# Patient Record
Sex: FEMALE | Race: WHITE | NOT HISPANIC OR LATINO | Employment: OTHER | URBAN - METROPOLITAN AREA
[De-identification: names, ages, dates, MRNs, and addresses within clinical notes are randomized per-mention and may not be internally consistent; named-entity substitution may affect disease eponyms.]

---

## 2017-09-08 ENCOUNTER — APPOINTMENT (OUTPATIENT)
Dept: GENERAL RADIOLOGY | Facility: HOSPITAL | Age: 72
End: 2017-09-08

## 2017-09-08 ENCOUNTER — HOSPITAL ENCOUNTER (EMERGENCY)
Facility: HOSPITAL | Age: 72
Discharge: HOME | End: 2017-09-09
Attending: EMERGENCY MEDICINE | Admitting: EMERGENCY MEDICINE

## 2017-09-08 ENCOUNTER — APPOINTMENT (OUTPATIENT)
Dept: CT IMAGING | Facility: HOSPITAL | Age: 72
End: 2017-09-08

## 2017-09-08 DIAGNOSIS — R55 NEAR SYNCOPE: Primary | ICD-10-CM

## 2017-09-08 DIAGNOSIS — S01.81XA FOREHEAD LACERATION, INITIAL ENCOUNTER: ICD-10-CM

## 2017-09-08 DIAGNOSIS — Z86.79 HISTORY OF SINUS BRADYCARDIA: ICD-10-CM

## 2017-09-08 DIAGNOSIS — E86.9 VOLUME DEPLETION: ICD-10-CM

## 2017-09-08 LAB
ALBUMIN SERPL-MCNC: 3.8 G/DL (ref 3.2–4.8)
ALBUMIN/GLOB SERPL: 1.1 G/DL (ref 1.5–2.5)
ALP SERPL-CCNC: 86 U/L (ref 25–100)
ALT SERPL W P-5'-P-CCNC: 20 U/L (ref 7–40)
ANION GAP SERPL CALCULATED.3IONS-SCNC: 5 MMOL/L (ref 3–11)
AST SERPL-CCNC: 24 U/L (ref 0–33)
BASOPHILS # BLD AUTO: 0.03 10*3/MM3 (ref 0–0.2)
BASOPHILS NFR BLD AUTO: 0.4 % (ref 0–1)
BILIRUB SERPL-MCNC: 0.4 MG/DL (ref 0.3–1.2)
BILIRUB UR QL STRIP: NEGATIVE
BNP SERPL-MCNC: 50 PG/ML (ref 0–100)
BUN BLD-MCNC: 29 MG/DL (ref 9–23)
BUN/CREAT SERPL: 24.2 (ref 7–25)
CALCIUM SPEC-SCNC: 9.3 MG/DL (ref 8.7–10.4)
CHLORIDE SERPL-SCNC: 109 MMOL/L (ref 99–109)
CLARITY UR: CLEAR
CO2 SERPL-SCNC: 25 MMOL/L (ref 20–31)
COLOR UR: YELLOW
CREAT BLD-MCNC: 1.2 MG/DL (ref 0.6–1.3)
DEPRECATED RDW RBC AUTO: 44.3 FL (ref 37–54)
EOSINOPHIL # BLD AUTO: 0.15 10*3/MM3 (ref 0–0.3)
EOSINOPHIL NFR BLD AUTO: 1.9 % (ref 0–3)
ERYTHROCYTE [DISTWIDTH] IN BLOOD BY AUTOMATED COUNT: 12.9 % (ref 11.3–14.5)
GFR SERPL CREATININE-BSD FRML MDRD: 44 ML/MIN/1.73
GLOBULIN UR ELPH-MCNC: 3.4 GM/DL
GLUCOSE BLD-MCNC: 173 MG/DL (ref 70–100)
GLUCOSE UR STRIP-MCNC: NEGATIVE MG/DL
HCT VFR BLD AUTO: 39.9 % (ref 34.5–44)
HGB BLD-MCNC: 13.2 G/DL (ref 11.5–15.5)
HGB UR QL STRIP.AUTO: NEGATIVE
IMM GRANULOCYTES # BLD: 0.02 10*3/MM3 (ref 0–0.03)
IMM GRANULOCYTES NFR BLD: 0.3 % (ref 0–0.6)
KETONES UR QL STRIP: NEGATIVE
LEUKOCYTE ESTERASE UR QL STRIP.AUTO: NEGATIVE
LIPASE SERPL-CCNC: 32 U/L (ref 6–51)
LYMPHOCYTES # BLD AUTO: 1.68 10*3/MM3 (ref 0.6–4.8)
LYMPHOCYTES NFR BLD AUTO: 21.4 % (ref 24–44)
MCH RBC QN AUTO: 31.1 PG (ref 27–31)
MCHC RBC AUTO-ENTMCNC: 33.1 G/DL (ref 32–36)
MCV RBC AUTO: 93.9 FL (ref 80–99)
MONOCYTES # BLD AUTO: 0.63 10*3/MM3 (ref 0–1)
MONOCYTES NFR BLD AUTO: 8 % (ref 0–12)
NEUTROPHILS # BLD AUTO: 5.35 10*3/MM3 (ref 1.5–8.3)
NEUTROPHILS NFR BLD AUTO: 68 % (ref 41–71)
NITRITE UR QL STRIP: NEGATIVE
PH UR STRIP.AUTO: 6.5 [PH] (ref 5–8)
PLATELET # BLD AUTO: 186 10*3/MM3 (ref 150–450)
PMV BLD AUTO: 10 FL (ref 6–12)
POTASSIUM BLD-SCNC: 4.2 MMOL/L (ref 3.5–5.5)
PROT SERPL-MCNC: 7.2 G/DL (ref 5.7–8.2)
PROT UR QL STRIP: NEGATIVE
RBC # BLD AUTO: 4.25 10*6/MM3 (ref 3.89–5.14)
SODIUM BLD-SCNC: 139 MMOL/L (ref 132–146)
SP GR UR STRIP: 1.01 (ref 1–1.03)
TROPONIN I SERPL-MCNC: 0 NG/ML (ref 0–0.07)
TROPONIN I SERPL-MCNC: 0 NG/ML (ref 0–0.07)
UROBILINOGEN UR QL STRIP: NORMAL
WBC NRBC COR # BLD: 7.86 10*3/MM3 (ref 3.5–10.8)

## 2017-09-08 PROCEDURE — 99284 EMERGENCY DEPT VISIT MOD MDM: CPT

## 2017-09-08 PROCEDURE — 70450 CT HEAD/BRAIN W/O DYE: CPT

## 2017-09-08 PROCEDURE — 84484 ASSAY OF TROPONIN QUANT: CPT

## 2017-09-08 PROCEDURE — 83690 ASSAY OF LIPASE: CPT | Performed by: EMERGENCY MEDICINE

## 2017-09-08 PROCEDURE — 93005 ELECTROCARDIOGRAM TRACING: CPT | Performed by: EMERGENCY MEDICINE

## 2017-09-08 PROCEDURE — 80053 COMPREHEN METABOLIC PANEL: CPT | Performed by: EMERGENCY MEDICINE

## 2017-09-08 PROCEDURE — 25010000002 TDAP 5-2.5-18.5 LF-MCG/0.5 SUSPENSION: Performed by: EMERGENCY MEDICINE

## 2017-09-08 PROCEDURE — 85025 COMPLETE CBC W/AUTO DIFF WBC: CPT | Performed by: EMERGENCY MEDICINE

## 2017-09-08 PROCEDURE — 90471 IMMUNIZATION ADMIN: CPT | Performed by: EMERGENCY MEDICINE

## 2017-09-08 PROCEDURE — 71010 HC CHEST PA OR AP: CPT

## 2017-09-08 PROCEDURE — 93005 ELECTROCARDIOGRAM TRACING: CPT

## 2017-09-08 PROCEDURE — 83880 ASSAY OF NATRIURETIC PEPTIDE: CPT | Performed by: EMERGENCY MEDICINE

## 2017-09-08 PROCEDURE — 96360 HYDRATION IV INFUSION INIT: CPT

## 2017-09-08 PROCEDURE — 90715 TDAP VACCINE 7 YRS/> IM: CPT | Performed by: EMERGENCY MEDICINE

## 2017-09-08 PROCEDURE — 81003 URINALYSIS AUTO W/O SCOPE: CPT | Performed by: EMERGENCY MEDICINE

## 2017-09-08 RX ORDER — LOSARTAN POTASSIUM 50 MG/1
50 TABLET ORAL DAILY
COMMUNITY

## 2017-09-08 RX ORDER — LANOLIN ALCOHOL/MO/W.PET/CERES
1000 CREAM (GRAM) TOPICAL DAILY
COMMUNITY

## 2017-09-08 RX ORDER — LITHIUM CARBONATE 300 MG/1
300 CAPSULE ORAL
COMMUNITY

## 2017-09-08 RX ORDER — SODIUM CHLORIDE 0.9 % (FLUSH) 0.9 %
10 SYRINGE (ML) INJECTION AS NEEDED
Status: DISCONTINUED | OUTPATIENT
Start: 2017-09-08 | End: 2017-09-09 | Stop reason: HOSPADM

## 2017-09-08 RX ORDER — LEVOTHYROXINE SODIUM 0.12 MG/1
125 TABLET ORAL DAILY
COMMUNITY

## 2017-09-08 RX ORDER — MELATONIN
1000 DAILY
COMMUNITY

## 2017-09-08 RX ORDER — MULTIVITAMIN WITH IRON
TABLET ORAL
COMMUNITY

## 2017-09-08 RX ORDER — LIDOCAINE HYDROCHLORIDE AND EPINEPHRINE 10; 10 MG/ML; UG/ML
10 INJECTION, SOLUTION INFILTRATION; PERINEURAL ONCE
Status: COMPLETED | OUTPATIENT
Start: 2017-09-08 | End: 2017-09-08

## 2017-09-08 RX ADMIN — LIDOCAINE HYDROCHLORIDE,EPINEPHRINE BITARTRATE 10 ML: 10; .01 INJECTION, SOLUTION INFILTRATION; PERINEURAL at 22:20

## 2017-09-08 RX ADMIN — TETANUS TOXOID, REDUCED DIPHTHERIA TOXOID AND ACELLULAR PERTUSSIS VACCINE, ADSORBED 0.5 ML: 5; 2.5; 8; 8; 2.5 SUSPENSION INTRAMUSCULAR at 21:49

## 2017-09-08 RX ADMIN — SODIUM CHLORIDE 1000 ML: 9 INJECTION, SOLUTION INTRAVENOUS at 22:46

## 2017-09-09 VITALS
HEIGHT: 65 IN | SYSTOLIC BLOOD PRESSURE: 144 MMHG | DIASTOLIC BLOOD PRESSURE: 77 MMHG | WEIGHT: 188 LBS | OXYGEN SATURATION: 100 % | TEMPERATURE: 97.8 F | RESPIRATION RATE: 16 BRPM | HEART RATE: 72 BPM | BODY MASS INDEX: 31.32 KG/M2

## 2017-09-09 NOTE — ED PROVIDER NOTES
Subjective   HPI Comments: The patient presents to the emergency department after near syncopal episode at a ball game tonight.  The patient is visiting from out of town and states she was sitting in the stands and started to feel faint and dizzy.  She states she felt like she needed to go to the bathroom.  She then ambulated to the restroom it which point she urinated and on the point she came out of the restroom she nearly passed out.  She lowered herself to the ground.  She states she never fully lost consciousness.  The patient reports a history of similar with an episode back in 2014 but often feels dizzy and near syncopal.  She states that she sees a cardiologist for this as had a full workup in [place].  The patient reports she had a complete cardiac workup in June.  She does report a history of sinus bradycardia.  The patient denies any chest pain or shortness of breath.  She states is similar to prior episodes.  She does have a laceration above the left eyebrow from her glasses.      History provided by:  Patient and relative      Review of Systems   Constitutional: Negative.    Respiratory: Negative.    Cardiovascular: Negative.    Gastrointestinal: Negative.    Skin: Positive for wound.   Neurological: Positive for dizziness, syncope and light-headedness.   Psychiatric/Behavioral: Negative.    All other systems reviewed and are negative.      Past Medical History:   Diagnosis Date   • Bipolar 1 disorder    • Depression    • Disease of thyroid gland    • Hypertension    • Renal disorder        No Known Allergies    Past Surgical History:   Procedure Laterality Date   • ADENOIDECTOMY     • DILATION AND CURETTAGE, DIAGNOSTIC / THERAPEUTIC     • KNEE MENISCAL REPAIR     • TONSILLECTOMY         History reviewed. No pertinent family history.    Social History     Social History   • Marital status:      Spouse name: N/A   • Number of children: N/A   • Years of education: N/A     Social History Main Topics    • Smoking status: Former Smoker   • Smokeless tobacco: None   • Alcohol use Yes      Comment: occasional - wine   • Drug use: No   • Sexual activity: Not Asked     Other Topics Concern   • None     Social History Narrative   • None           Objective   Physical Exam   Constitutional: She is oriented to person, place, and time. She appears well-developed and well-nourished. No distress.   HENT:   Head: Normocephalic. Head is with laceration. Head is without raccoon's eyes and without Strickland's sign.       Eyes: EOM are normal. Pupils are equal, round, and reactive to light.   Neck: Normal range of motion. Neck supple.   Cardiovascular: Normal rate, regular rhythm, normal heart sounds and intact distal pulses.    Occasional ectopy.   Pulmonary/Chest: Effort normal and breath sounds normal.   Abdominal: Soft. There is no tenderness. There is no rebound and no guarding.   Musculoskeletal: Normal range of motion.   No midline C, T, L-spine tenderness to palpation or step-off.   Neurological: She is alert and oriented to person, place, and time.   Skin: Skin is warm and dry. Laceration (3CM laceration on the left side superior and lateral to the left eyebrow.) noted.   Psychiatric: She has a normal mood and affect. Her behavior is normal.   Nursing note and vitals reviewed.      Laceration Repair  Date/Time: 9/8/2017 10:22 PM  Performed by: ROMELIA MARCOS  Authorized by: ROMELIA MARCOS   Consent: Verbal consent obtained.  Risks and benefits: risks, benefits and alternatives were discussed  Consent given by: patient  Patient understanding: patient states understanding of the procedure being performed  Patient consent: the patient's understanding of the procedure matches consent given  Patient identity confirmed: verbally with patient  Body area: head/neck  Location details: left eyebrow  Foreign bodies: no foreign bodies  Tendon involvement: none  Nerve involvement: none  Vascular damage:  no    Anesthesia:  Local Anesthetic: lidocaine 1% with epinephrine    Sedation:  Patient sedated: no  Preparation: Patient was prepped and draped in the usual sterile fashion.  Irrigation solution: saline  Amount of cleaning: standard  Debridement: none  Degree of undermining: none  Skin closure: 5-0 Prolene  Number of sutures: 5  Technique: simple  Approximation: close  Approximation difficulty: simple  Patient tolerance: Patient tolerated the procedure well with no immediate complications               ED Course  ED Course   Value Comment By Time   Troponin I: 0.00 (Reviewed) John Cabral MD 09/09 0000    Patient has 2 sets of cardiac enzymes.  Patient's laceration has been repaired.  Patient has had this evaluated in the past with history of similar.  Patient states she does not need to be admitted and states she will follow-up with her doctor.  We will discharge her home with her daughter for close observation and follow-up as soon as possible.  They're to return here the ER for any concerns.  They voice understanding and agree. John Cabral MD 09/09 0001      Recent Results (from the past 24 hour(s))   Comprehensive Metabolic Panel    Collection Time: 09/08/17  9:16 PM   Result Value Ref Range    Glucose 173 (H) 70 - 100 mg/dL    BUN 29 (H) 9 - 23 mg/dL    Creatinine 1.20 0.60 - 1.30 mg/dL    Sodium 139 132 - 146 mmol/L    Potassium 4.2 3.5 - 5.5 mmol/L    Chloride 109 99 - 109 mmol/L    CO2 25.0 20.0 - 31.0 mmol/L    Calcium 9.3 8.7 - 10.4 mg/dL    Total Protein 7.2 5.7 - 8.2 g/dL    Albumin 3.80 3.20 - 4.80 g/dL    ALT (SGPT) 20 7 - 40 U/L    AST (SGOT) 24 0 - 33 U/L    Alkaline Phosphatase 86 25 - 100 U/L    Total Bilirubin 0.4 0.3 - 1.2 mg/dL    eGFR Non African Amer 44 (L) >60 mL/min/1.73    Globulin 3.4 gm/dL    A/G Ratio 1.1 (L) 1.5 - 2.5 g/dL    BUN/Creatinine Ratio 24.2 7.0 - 25.0    Anion Gap 5.0 3.0 - 11.0 mmol/L   Lipase    Collection Time: 09/08/17  9:16 PM   Result Value Ref Range     Lipase 32 6 - 51 U/L   BNP    Collection Time: 09/08/17  9:16 PM   Result Value Ref Range    BNP 50.0 0.0 - 100.0 pg/mL   CBC Auto Differential    Collection Time: 09/08/17  9:16 PM   Result Value Ref Range    WBC 7.86 3.50 - 10.80 10*3/mm3    RBC 4.25 3.89 - 5.14 10*6/mm3    Hemoglobin 13.2 11.5 - 15.5 g/dL    Hematocrit 39.9 34.5 - 44.0 %    MCV 93.9 80.0 - 99.0 fL    MCH 31.1 (H) 27.0 - 31.0 pg    MCHC 33.1 32.0 - 36.0 g/dL    RDW 12.9 11.3 - 14.5 %    RDW-SD 44.3 37.0 - 54.0 fl    MPV 10.0 6.0 - 12.0 fL    Platelets 186 150 - 450 10*3/mm3    Neutrophil % 68.0 41.0 - 71.0 %    Lymphocyte % 21.4 (L) 24.0 - 44.0 %    Monocyte % 8.0 0.0 - 12.0 %    Eosinophil % 1.9 0.0 - 3.0 %    Basophil % 0.4 0.0 - 1.0 %    Immature Grans % 0.3 0.0 - 0.6 %    Neutrophils, Absolute 5.35 1.50 - 8.30 10*3/mm3    Lymphocytes, Absolute 1.68 0.60 - 4.80 10*3/mm3    Monocytes, Absolute 0.63 0.00 - 1.00 10*3/mm3    Eosinophils, Absolute 0.15 0.00 - 0.30 10*3/mm3    Basophils, Absolute 0.03 0.00 - 0.20 10*3/mm3    Immature Grans, Absolute 0.02 0.00 - 0.03 10*3/mm3   Urinalysis With / Culture If Indicated    Collection Time: 09/08/17  9:17 PM   Result Value Ref Range    Color, UA Yellow Yellow, Straw    Appearance, UA Clear Clear    pH, UA 6.5 5.0 - 8.0    Specific Gravity, UA 1.013 1.001 - 1.030    Glucose, UA Negative Negative    Ketones, UA Negative Negative    Bilirubin, UA Negative Negative    Blood, UA Negative Negative    Protein, UA Negative Negative    Leuk Esterase, UA Negative Negative    Nitrite, UA Negative Negative    Urobilinogen, UA 0.2 E.U./dL 0.2 - 1.0 E.U./dL   POC Troponin, Rapid    Collection Time: 09/08/17  9:43 PM   Result Value Ref Range    Troponin I 0.00 0.00 - 0.07 ng/mL   POC Troponin, Rapid    Collection Time: 09/08/17 11:36 PM   Result Value Ref Range    Troponin I 0.00 0.00 - 0.07 ng/mL     Note: In addition to lab results from this visit, the labs listed above may include labs taken at another facility or  during a different encounter within the last 24 hours. Please correlate lab times with ED admission and discharge times for further clarification of the services performed during this visit.    CT Head Without Contrast   Final Result   Abnormal     No acute intracranial abnormality.      THIS DOCUMENT HAS BEEN ELECTRONICALLY SIGNED BY REJI HEARN MD      XR Chest 1 View   Final Result   Abnormal     No acute abnormality.        19 mm nodule at the right upper lobe, possibly at least partially calcified.     CT as clinically warranted.      THIS DOCUMENT HAS BEEN ELECTRONICALLY SIGNED BY REJI HEARN MD        Vitals:    09/08/17 2200 09/08/17 2237 09/08/17 2247 09/09/17 0021   BP: 132/89  136/88 144/77   BP Location:       Patient Position:       Pulse:  72     Resp:       Temp:       TempSrc:       SpO2: 96% 100%     Weight:       Height:         Medications   lidocaine-EPINEPHrine (XYLOCAINE W/EPI) 1 %-1:565480 injection 10 mL (10 mL Injection Given 9/8/17 2220)   Tdap (BOOSTRIX) injection 0.5 mL (0.5 mL Intramuscular Given 9/8/17 2149)   sodium chloride 0.9 % bolus 1,000 mL (0 mL Intravenous Stopped 9/9/17 0007)     ECG/EMG Results (last 24 hours)     Procedure Component Value Units Date/Time    ECG 12 Lead [[id number]] Collected:  09/08/17 2041     Updated:  09/08/17 2044                    MDM  Number of Diagnoses or Management Options  Diagnosis management comments: ECG/EMG Results (last 24 hours)     Procedure Component Value Units Date/Time    ECG 12 Lead ([id number]) Collected:  09/08/17 2041     Updated:  09/08/17 2044    ECG 12 Lead ([id number]) Collected:  09/08/17 2326     Updated:  09/08/17 2329             Amount and/or Complexity of Data Reviewed  Clinical lab tests: reviewed  Tests in the radiology section of CPT®: reviewed  Tests in the medicine section of CPT®: reviewed  Decide to obtain previous medical records or to obtain history from someone other than the patient: yes  Obtain history from someone  other than the patient: yes  Independent visualization of images, tracings, or specimens: yes        Final diagnoses:   Near syncope   History of sinus bradycardia   Volume depletion   Forehead laceration, initial encounter            Jayro Arguelles  09/08/17 2243       Jayro Arguelles  09/09/17 0004       John Cabral MD  09/09/17 0515